# Patient Record
Sex: MALE | Race: WHITE
[De-identification: names, ages, dates, MRNs, and addresses within clinical notes are randomized per-mention and may not be internally consistent; named-entity substitution may affect disease eponyms.]

---

## 2017-06-24 NOTE — REP
MRI LUMBAR SPINE WITHOUT CONTRAST:  06/23/2017

 

CLINICAL HISTORY: 52-year-old male with low back pain and bilateral

radiculopathy.

 

COMPARISON:  X-ray 05/21/2009.

 

TECHNIQUE: Sagittal T1, T2 and STIR images with axial T1 and T2 sequences

provided.

 

FINDINGS:  Normal lumbar lordosis is maintained.  There is disc space narrowing

at L5-S1, less at L4-5, L1-2  and least at L3-L4.  The L2-3 level is spared.

Disc water and height are normal at L2-3 with some preservation of disc water

signal at L3-4, the other levels showing loss of water signal.  Vertebral body

heights and marrow signal are normal throughout.  Conus terminates at T12-L1.

There is a disc bulge at T12-L1 not causing central canal stenosis.  Foramina are

adequate.

 

At L1-2,  there is a central and left paracentral disc protrusion thinning the

ventral subarachnoid space, but the cross-sectional area of the canal is adequate

and the nerve roots are not compressed in the central canal. Foramina are

adequate at this level.

 

At L2-3, there is a mild broad-based disc bulge not causing any significant

central canal stenosis.  Foramina are adequate.

 

At L3-4, there is a central/right paracentral disc protrusion combined with

ligamentum flavum and facet hypertrophy to cause central canal stenosis.  There

is nerve root crowding at this level.  The foramina show adequate perineural fat

without specific nerve root compression on either side.  There are small

vertebral hemangiomas in the L3 vertebral body.

 

At L4-L5,  there is a broad-based disc bulge with small protrusion central and

left paracentral.  Combined with ligamentum flavum hypertrophy and facet

arthritis, the central canal is only marginally adequate.  The foramina show loss

of perineural fat without nerve root compression.

 

At L5-S1, broad-based disc bulge with the cross-sectional area of the canal

adequate.  A few millimeters of anterolisthesis of L5 on S1 due to facet

arthropathy noted.  The foramina show bilateral nerve root compression due to the

anterolisthesis, facet and marginal osteophytes.

 

IMPRESSION:

 

1.  Multilevel degenerative disc disease with minimal central canal stenosis at

L3-4 with a central and right paracentral disc protrusion, ligamentum and facet

hypertrophy.  The foramina are adequate.

 

2. At L5, S1, there is a broad-based disc bulge with some anterolisthesis of L5

on S1 due to facet arthropathy at this level.  Bilateral L5 nerve root

compression due to those factors and marginal osteophytes.

 

3.  L4-5 disc bulge and small central protrusion with marginally adequate central

canal and foramina.  Levels above show lesser degenerative change.

 

 

Signed by

Arun Roberson MD 06/24/2017 07:54 P

## 2017-07-20 NOTE — REP
Partial lumbar spine series:  Four views .

 

History:  Injection procedure for pain.

 

16 seconds of fluoroscopy time is reported.

 

Findings:  A sequence of four fluoroscopically obtained last image hold

procedural spot radiographs of the lumbar spine document needle position and

contrast injection associated with injection procedure.

 

 

Signed by

Porfirio Szymanski MD 07/20/2017 04:42 P

## 2017-07-30 NOTE — ECWPNPC
PATIENT NAME: GABRIEL LOW

: 1965

GENDER: MALE

MRN: [id number]

VISIT DATE: 2017

DISCHARGE DATE: 17 1459

VISIT LOCKED DATE TIME: 

PHYSICIAN: CHIARA QUIROZ  

RESOURCE: CHIARA QUIROZ  

 

           

           

REASON FOR APPOINTMENT

           

          1. INTRALAMINAR LUMBAR EPIDURAL

           

HISTORY OF PRESENT ILLNESS

           

      HISTORY OF PRESENT ILLNESS:

      PAIN

           

           

          THE PATIENT DESCRIBES THE PAIN...

           

      FALL RISK SCREENING:

      SCREENING

           

           

          :NO FALLS IN THE PAST YEAR

           

CURRENT MEDICATIONS

           

          TAKING LAMOTRIGINE 100 MG TABLET 2 TABLETS ORALLY TWICE A DAY,

          NOTES: 17 AM

          TAKING CLONAZEPAM 1 MG TABLET 1 TABLET ORALLY 4-6 TIMES DAILY,

          NOTES: 17 AM

          TAKING SERTRALINE HCL 50 MG TABLET 1 TABLET ORALLY ONCE A DAY,

          NOTES: 17 AM

          TAKING OXYCODONE-ACETAMINOPHEN 5-325 MG TABLET 1 TABLET AS NEEDED

          ORALLY EVERY 8 HRS AS NEEDED MDD 4, NOTES: 17 0500

          TAKING CELEBREX 200 MG CAPSULE 1 CAPSULE WITH FOOD ORALLY TWICE A

          DAY, NOTES: 17 AM

          TAKING TIZANIDINE HCL 4 MG TABLET 1 TABLET AS NEEDED ORALLY THREE

          TIMES A DAY, NOTES: 17 0500

          TAKING ACETAMINOPHEN EXTRA STRENGTH 500 MG TABLET 2 TABLETS AS

          NEEDED ORALLY EVERY 6 HRS, NOTES: NONE

          MEDICATION LIST REVIEWED AND RECONCILED WITH THE PATIENT

           

PAST MEDICAL HISTORY

           

          HYPERCHOLESTEROLEMIA

          ANXIETY

          BACK PAIN

          CARDIAC ACTH

           

ALLERGIES

           

          PENICILLIN (FOR ALLERGIES USE ONLY)

          BEE STINGS

          SHELLFISH: RASH, RESPIRATORY DIFFICULTIES: ALLERGY

           

REVIEW OF SYSTEMS

           

      REVIEWED BY:

           

          PROVIDER: _____ .

           

      CONSTITUTIONAL:

           

          ANY CHANGE IN YOUR MEDICAL CONDITION? NO . CHILLS NO . FEVER NO .

           

      INFECTION:

           

          DO YOU HAVE NEW INFECTIONS? NO . DO YOU HAVE HISTORY OF MRSA? NO

          .

           

      MUSCULOSKELETAL:

           

          ANY NEW PATTERNS OF PAIN OR NUMBNESS? NO .

           

      GASTROENTEROLOGY:

           

          ANY NEW CHANGE IN BOWEL CONTROL? NO .

           

      GENITOURINARY:

           

          ANY NEW CHANGE IN BLADDER CONTROL? NO . IS THERE A CHANCE YOU

          COULD BE PREGNANT? NO .

           

      HEMATOLOGY/LYMPH:

           

          DO YOU TAKE ANY BLOOD THINNERS? (FOR EXAMPLE- COUMADIN, PLAVIX,

          AGGRENOX, PLATEL, PRADAXA, OR XARELTO) NO . WHEN WAS YOUR LAST

          DOSE? DATE: TIME: .

           

      NEUROLOGY:

           

          HAVE YOU FALLEN IN THE PAST 6 MONTHS? NO . ANY NEW EXTREMITY

          NUMBNESS OR WEAKNESS? NO .

           

      CARDIOLOGY:

           

          DO YOU HAVE A PACEMAKER OR DEFIBRILLATOR? NO .

           

      RESPIRATORY:

           

          HAVE YOU BEEN SICK IN THE PAST WEEK? NO . FEVER NO . FLU LIKE

          SYMPTOMS? NO . COUGH NO .

           

      INTEGUMENTARY:

           

          DO YOU HAVE ANY RASHES OR OPEN SORES? NO .

           

      ALLERGIC/IMMUNO:

           

          ARE YOU ALLERGIC TO SHELLFISH OR IV DYE? NO . ANY NEW ALLERGIES?

          NO .

           

      PSYCHIATRIC:

           

          DO YOU HAVE THOUGHTS OF HURTING YOURSELF OR SOMEONE ELSE? NO .

          ARE YOU ABUSED, NEGLECTED, OR IN AN UNSAFE ENVIRONMENT? NO .

           

      ENDOCRINOLOGY:

           

          ARE YOU DIABETIC? NO .

           

      OTHER:

           

          DO YOU NEED ANY PRESCRIPTIONS? NO . IF YES, PLEASE LIST: ____ .

          ANY NEW PROBLEMS WITH YOUR MEDICATIONS? NO . WHEN DID YOU LAST

          EAT? 17 0145 . WHEN DID YOU LAST DRINK? 17 0800 . WHAT

          DID YOU LAST DRINK? COFFEE . NAME OF PERSON DRIVING YOU HOME?

          FAN CHECK . DO YOU HAVE ANY OTHER QUESTIONS OR CONCERNS NO .

           

VITAL SIGNS

           

          .0 LBS, HT 69 IN, BMI 28.06 INDEX, /87 MM HG, HR 75

          /MIN, RR 16 /MIN, TEMP 97.4 F, OXYGEN SAT % 97%, NA INITIALS TL

          1057, REVIEWED BY: CM.

           

ASSESSMENTS

           

          INTERVERTEBRAL DISC DISORDERS WITH RADICULOPATHY, LUMBAR REGION -

          M51.16 (PRIMARY)

           

PROCEDURES

           

           

          PRE PROCEDURE DIAGNOSIS LUMBAR DISC DISORDER WITH RADICULOPATHY

          POST PROCEDURE DIAGNOSIS LUMBAR DISC DISORDER WITH RADICULOPATHY

          PROCEDURE LUMBAR EPIDURAL STEROID INJECTION UNDER FLUOROSCOPIC

          GUIDANCE

          SURGEON DR. CHIARA QUIROZ

          ASSISTANT NONE

          ANESTHESIA LOCAL

          PRE PROCEDURE NOTE THE PATIENT HAS A HISTORY OF CHRONIC LOW BACK

          PAIN. I EVALUATE THE PATIENT AND REVIEWED THE CHART. I WENT OVER

          THE RISKS, ALTERNATIVES, AND BENEFITS ASSOCIATED WITH THIS

          PROCEDURE. THE PATIENT WOULD LIKE TO PROCEED AND GIVE CONSENT TO

          PERFORMED THE PROCEDURE. THE PATIENT DENIES UNEXPLAINABLE WEIGHT

          LOSS, FEVER, CHILLS, OR NEW CHANGES IN URINARY OR BOWEL CONTROL.

          DESCRIPTION OF PROCEDURE THE PATIENT WAS BROUGHT TO THE PROCEDURE

          ROOM AND PLACED IN THE PRONE POSITION. THE LUMBOSACRAL AREA WAS

          CLEANED WITH BETADINE SOLUTION AND DRAPED ASEPTICALLY. THE

          PROCEDURE WAS DONE UNDER STERILE CONDITIONS. I CHECKED LATERALITY

          AND THE LEVEL WHERE THE PROCEDURE WAS GOING TO BE PERFORMED WITH

          THE PATIENT AND THE SUPPORTING STAFF AT THE MOMENT OF THE TIME

          OUT IN THE PROCEDURE ROOM. UNDER FLUOROSCOPIC GUIDANCE, THE

          TARGET POINT WAS SELECTED AT THE INTERLAMINAR LEVEL OF L4-L5.

          LIDOCAINE WAS USED TO NUMB THE SKIN AND THE SUBCUTANEOUS TISSUE

          BELOW IT. EPIDURAL TUOHY NEEDLE, 17-GAUGE, WAS ADVANCED UNDER

          FLUOROSCOPIC GUIDANCE AND FOLLOWING PATIENT FEEDBACK UNTIL THE

          EPIDURAL SPACE WAS REACHED, 7 CM DEEP INTO THE SKIN BY THE LOSS

          OF RESISTANCE TECHNIQUE. ISOVUE M DYE 30%, 0.25 ML, WAS INJECTED

          SHOWING ADEQUATE SPREAD OF THE DYE. THEN, A SOLUTION OF 3 ML OF

          NORMAL SALINE WITH DEPO-MEDROL 60 MG WAS INJECTED SLOWLY

          FOLLOWING PATIENT FEEDBACK. THERE WAS NO EVIDENCE OF BLOOD,

          PARESTHESIA OR CEREBROSPINAL FLUID DURING THE PROCEDURE. THE

          PATIENT WAS SENT TO THE RECOVERY ROOM. THE PATIENT WAS MOVING THE

          EXTREMITIES AND DOING WELL. THERE WAS NO COMPLICATION DURING THE

          PROCEDURE. FLUOROSCOPY TIME WAS 16 SECONDS.

          POST PROCEDURE NOTE THE PATIENT WILL BE SEEN IN A FOLLOW UP IN

          THE NEXT FEW WEEKS. INSTRUCTIONS WERE GIVEN, QUESTIONS WERE

          ANSWERED, AND THE PATIENT EXPRESSED UNDERSTANDING AND AGREES WITH

          THE PLAN. I, OLMAN CONNER, DOCUMENTED THE ABOVE INFORMATION

          ACTING AS A SCRIBE FOR DR. QUIROZ. I HAVE REVIEWED THE ABOVE

          DOCUMENT, WRITTEN BY OLMAN ADAMS AND I VERIFY THAT IT

          IS ACCURATE

           

DIAGNOSTIC IMAGING

           

          [facility] FLUORO GUIDE SPINE INJECTION (PAIN)[id number]

           

PROCEDURE CODES

           

          03338 LUMBAR/SACRAL W/ IMAGING

           

          6045F RADXPS IN END [id number] PXD

           

DISPOSITION & COMMUNICATION

           

FOLLOW UP

           

          3 WEEKS

           

 

ELECTRONICALLY SIGNED BY CHIARA QUIROZ MD ON

          2017 AT 08:10 PM EDT

           

           

           

 

DISCLAIMER :

THIS IS A VISIT SUMMARY EXTRACTED FROM THE [employer] CHART.

IT IS NOT A COPY OF THE [employer] PROGRESS NOTE.

MTDD

## 2017-08-06 NOTE — ECWPNPC
PATIENT NAME: GABRIEL LOW

: 1965

GENDER: MALE

MRN: [id number]

VISIT DATE: 2017

DISCHARGE DATE: 17 1328

VISIT LOCKED DATE TIME: 

PHYSICIAN: DENICE BEAULIEU 

RESOURCE: DENICE BEAULIEU 

 

           

           

REASON FOR APPOINTMENT

           

          1. BACK PAIN

           

HISTORY OF PRESENT ILLNESS

           

      NEW PATIENT CONSULT:

      WHEN DID YOUR PAIN FIRST START?

          _____.

           

      BRIEFLY DESCRIBE HOW YOUR PAIN STARTED?

          ______.

           

      HOW DOES YOUR PAIN CHANGE WITH TIME?

          _______.

           

      DOES YOUR PAIN AWAKEN YOU FROM SLEEP?

          _____.

           

      HOW MANY HOURS OF SLEEP DO YOU NORMALLY GET?

          ______.

           

      ANY DIAGNOSTIC TESTING?

          _____.

           

      FACILITY WHERE TESTS WERE DONE?

          ____.

           

      PAIN TREATMENT

           

           

          TREATMENT YES

           

      CANCER

           

           

          HAVE YOU EVER HAD ANY TYPE OF CANCER?NO

           

           

          NO.

           

      PAIN SCREENING:

      PATIENT HAS A COMPLAINT OF ACUTE OR CHRONIC PAIN

           

           

          :YES

           

      FALL RISK SCREENING:

      SCREENING

           

           

          :NO FALLS IN THE PAST YEAR

           

      BECK INVENTORY:

      QUESTIONNAIRE

           

           

          ASSESSEDYES

           

      SCORE

           

           

          VALUE CALCULATED YES

          SCORE: 14/63 DENIES SUICIDAL OR HOMICIDAL IDEATION

           

      TODAY'S VISIT:

      NOTES:

          PT REFERRED BY DR VICENTE LOTT, [facility] FOR LOW

          BACK AND LEG PAIN. THIS IS NOT WORK RELATED. ONSET OF SEVERE BACK

          PAIN ABOUT 17 AFTER WORKING ON A FENCE. HAD NEVER HAD PAIN

          LIKE THIS BEFORE. INITIALLY PAIN WAS JUST ACROSS THE BACK, BUT

          THEN COULDN&#39;T WALK MORE THAN 10-15 FEET. STATES LEGS FELT

          LIKE CONCRETE. NOTES WORST ON THE RIGHT SIDE WITH NUMBNESS,

          TINGLING AND WEAKNESS IN LEGS RIGHT > LEFT.. COLD AIR

          INCREASES THE PAIN. NO LOSS OF BOWEL OR BLADDER CONTROL. TO ER

          7/10 /17 FOR BACK PAIN , AND HAS BEEN TO URGENT CARE FOR THE

          SAME. . SPASM INTO THE BACK HAVE BEEN UNBEARABLE. SLEEP HAS

          IMPROVED ON OXYCODONE. IS DOING PT THROUGH [facility] AND CMT

          WITH DR ZHANG. HARD TO TURN IN BED NOW. HAS APPOINTMENT WITH FOR

          SURGICAL EVALUATION&NBSP;SOS 17..

           

CURRENT MEDICATIONS

           

          TAKING LAMOTRIGINE 100 MG TABLET 2 TABLETS ORALLY TWICE A DAY

          TAKING CLONAZEPAM 1 MG TABLET 1 TABLET ORALLY 4-6 TIMES DAILY

          TAKING SERTRALINE HCL 50 MG TABLET 1 TABLET ORALLY ONCE A DAY

          TAKING OXYCODONE-ACETAMINOPHEN 5-325 MG TABLET 1 TABLET AS NEEDED

          ORALLY EVERY 8 HRS AS NEEDED MDD 4

          TAKING CELEBREX 200 MG CAPSULE 1 CAPSULE WITH FOOD ORALLY TWICE A

          DAY

          TAKING TIZANIDINE HCL 4 MG TABLET 1 TABLET AS NEEDED ORALLY THREE

          TIMES A DAY

          TAKING ACETAMINOPHEN EXTRA STRENGTH 500 MG TABLET 2 TABLETS AS

          NEEDED ORALLY EVERY 6 HRS

          MEDICATION LIST REVIEWED AND RECONCILED WITH THE PATIENT

           

PAST MEDICAL HISTORY

           

          HYPERCHOLESTEROLEMIA

          ANXIETY

          BACK PAIN

          CARDIAC ACTH

           

ALLERGIES

           

          PENICILLIN (FOR ALLERGIES USE ONLY)

          BEE STINGS

           

SURGICAL HISTORY

           

          LEFT HAND SURGERY

          FACIAL SURGERY

           

FAMILY HISTORY

           

          FATHER: , DIAGNOSED WITH CANCER

          MOTHER: ALIVE

           

SOCIAL HISTORY

           

          GENERAL:

           

          TOBACCO USE

          ARE YOU A:NONSMOKER

          ADDITIONAL FINDINGS: TOBACCO USERCHEWS FINE CUT TOBACCO

           

           

          ALCOHOL SCREENING

          POINTS0

          INTERPRETATIONNEGATIVE

           

           

          RECREATIONAL DRUG USE

          DRUG USE?NO

           

           

          CAFFEINE

          CAFFEINE USE?YES

          HOW OFTEN AND HOW MUCH? COFFEE DAILY

           

           

          LEARNING BARRIERS / SPECIAL NEEDS

          BARRIERS TO LEARNING?NO

          HEARING IMPAIRED?NO

          VISION IMPAIRED?YES

          : READING GLASSES

          READINESS TO LEARN?YES

          LEARNING PREFERENCES?NO

          SPECIAL DEVICES?YES

          : CANE

           

           

          PAIN CLINIC PFS, CLERGY, PUBLIC HEALTH REFERRALS

          PFS REFERRAL NEEDED?NO

          CLERGY REFERRAL NEEDED?NO

          PUBLIC HEALTH REFERRAL NEEDED?NO

          WAS THE PROVIDER NOTIFIED OF ANY PERTINENT INFO?NO

          HAS THE PATIENT BEEN EDUCATED REGARDING HIS/HER PLAN OF CARE?YES

          HAS THE PATIENT BEEN EDUCATED REGARDING PAIN, THE RISK FOR PAIN,

          THE IMPORTANCE OF EFFECTIVE PAIN MANAGEMENT, AND THE PAIN

          ASSESSMENT PROCESS?YES

           

           

          PATIENT: ____.

           

           

          ADVANCE DIRECTIVES

          HEALTH CARE PROXY?NO

          WOULD YOU LIKE MORE INFORMATION?NO

           

HOSPITALIZATION/MAJOR DIAGNOSTIC PROCEDURE

           

          DENIES PAST HOSPITALIZATION

           

REVIEW OF SYSTEMS

           

      REVIEWED BY:

           

          PROVIDER: DENICE MORIN .

           

      CONSTITUTIONAL:

           

          ANY CHANGE IN YOUR MEDICAL CONDITION? NO . CHILLS NO . FEVER NO .

           

      INFECTION:

           

          DO YOU HAVE NEW INFECTIONS? NO . DO YOU HAVE HISTORY OF MRSA? NO

          .

           

      MUSCULOSKELETAL:

           

          ANY NEW PATTERNS OF PAIN OR NUMBNESS? NO . SYTEMIC LUPUS NO .

           

      GASTROENTEROLOGY:

           

          ANY NEW CHANGE IN BOWEL CONTROL? NO . BARRETTS ESOPHAGUS NO .

          CIRRHOSIS NO . HEPATITIS NO . LIVER FAILURE NO . ACID REFLUX NO .

          UNEXPLAINED WEIGHT LOSS NO .

           

      GENITOURINARY:

           

          ANY NEW CHANGE IN BLADDER CONTROL? NO . IS THERE A CHANCE YOU

          COULD BE PREGNANT? NO .

           

      HEMATOLOGY/LYMPH:

           

          DO YOU TAKE ANY BLOOD THINNERS? (FOR EXAMPLE- COUMADIN, PLAVIX,

          AGGRENOX, PLATEL, PRADAXA, OR XARELTO) NO . WHEN WAS YOUR LAST

          DOSE? DATE: TIME: . LOW PLATELET COUNT NO . SICKLE CELL DISEASE

          NO . VON WILLIEBRANDS NO . FACTOR V LEIDEN NO . THALLASEMIA NO .

          ANEMIA NO . EASY BRUISING NO .

           

      NEUROLOGY:

           

          HAVE YOU FALLEN IN THE PAST 6 MONTHS? NO . ANY NEW EXTREMITY

          NUMBNESS OR WEAKNESS? YES, RIGHT FOOT OCCASIONALLY . HEAD INJURY

          YES, MANY CONCUSSIONS IN PAST HISTORY. . DEMENTIA NO . CEREBRAL

          PALSY NO . MULTIPLE SCLEROSIS NO . DIZZINESS NO . HEADACHE NO .

          STROKES NO . VERTIGO NO .

           

      CARDIOLOGY:

           

          DO YOU HAVE A PACEMAKER OR DEFIBRILLATOR? NO . ANGINA NO . HEART

          ATTACK NO . HEART SURGERY NO . CONGESTIVE HEART FAILURE/FLUID

          OVERLOAD NO . CHEST PAIN HAS A HX OF CHEST PAIN. HAD CARDIAC WORK

          UP WITH DR MARCH. CARDIAC CATH. ALL NEGATIVE. . HIGH BLOOD PRESSURE

          NO . IRREGULAR HEART BEAT NO .

           

      RESPIRATORY:

           

          HAVE YOU BEEN SICK IN THE PAST WEEK? NO . FEVER NO . FLU LIKE

          SYMPTOMS? NO . CPAP NO . BYPAP NO . ASTHMA NO . EMPHYSEMA NO .

          CHRONIC LUNG DISEASES NO . SHORTNESS OF BREATH ON EXERTION NO .

          DO YOU USE ANY TYPE OF TOBACCO (SMOKE, SMOKELESS, CHEW)? YES .

          COUGH NO . SNORING NO .

           

      INTEGUMENTARY:

           

          DO YOU HAVE ANY RASHES OR OPEN SORES? NO .

           

      ALLERGIC/IMMUNO:

           

          ARE YOU ALLERGIC TO SHELLFISH OR IV DYE? YES, SHELLFISH RASH,

          SWELL UP, DIFFICULT BREATHING. . ANY NEW ALLERGIES? NO .

           

      PSYCHIATRIC:

           

          DO YOU HAVE THOUGHTS OF HURTING YOURSELF OR SOMEONE ELSE? NO .

          ARE YOU ABUSED, NEGLECTED, OR IN AN UNSAFE ENVIRONMENT? NO .

           

      ENDOCRINOLOGY:

           

          ARE YOU DIABETIC? NO . THYROID DISORDER NO .

           

      OTHER:

           

          DO YOU NEED ANY PRESCRIPTIONS? NO . IF YES, PLEASE LIST: ____ .

          ANY NEW PROBLEMS WITH YOUR MEDICATIONS? NO . WHEN DID YOU LAST

          EAT? ____ . WHEN DID YOU LAST DRINK? ____ . WHAT DID YOU LAST

          DRINK? ____ . NAME OF PERSON DRIVING YOU HOME? ____ . DO YOU HAVE

          ANY OTHER QUESTIONS OR CONCERNS NO .

           

VITAL SIGNS

           

          .2 LBS, HT 69 IN, BMI 28.38 INDEX, /79 MM HG, HR 75

          /MIN, RR 18 /MIN, TEMP 98.1 F, OXYGEN SAT % 96%, NA INITIALS SC

          11:53, REVIEWED BY: SAM.

           

EXAMINATION

           

      GENERAL EXAMINATION:

          GENERAL APPEARANCE:THIN, WELL TANNED.

           

          PSYCHALERT , ORIENTED X 3 , ANXIOUS, [facility] CONTACT.

          ACCOMPANIED BY WIFE FOR VISIT.

           

          HEENT:NORMOCEPHALIC, NO LYMPHADENENOPATHY, NO THYROMEGLY.

           

          LUNGS:CLEAR TO AUSCULTATION BILATERALLY, NO WHEEZES, RALES OR

          RHONCHI.

           

          HEART:NORMAL S1S2, NO MURMURS, CLICK OR RUBS, NO CAROTID BRUITS.

           

          MUSCULOSKELETAL:MUSCLE STRENGTH TESTING 5/5 BILATERAL UPPER AND

          LOWER EXTREMITIES. CAN FLEX TO 45 DEGREES, EXTEND TO 5 DERGREES

          AND HAS PAIN WITH ROTATION. PAIN W SLR ON LEFT. POINT TENDERNESS

          OVER RIGHT > LEFT LUMOSACRAL AXIS, NO SPECIFIC SIJ TENDERNESS.

          PAIN WITH PELVIC COMPRESSION AND PATRICKS TESTING ON LEFT. LEG

          LENGTH EQUAL. SLOW TO RISE TO STANDING POSIITION. POSTURE

          STOOPED, GAIT ANTALGIC.

           

          NEUROLOGIC EXAM:PATCHY DYSESTHESIA TO LIGHT TOUCH OVER DISTAL AND

          PROXIMAL LEFT LOWER EXTREMITY.DTR'S 1+ BILATERALLY IN UPPER AND

          LOWER EXTREMITIES.

           

          DIAGNOSTIC TESTS REVIEWEDMRI LUMBAR SPINE COMPLETED 17

          REVIEWED.

           

ASSESSMENTS

           

          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

           

          LUMBAR RADICULOPATHY - M54.16

           

          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

           

TREATMENT

           

      LUMBAR DISC DISPLACEMENT WITHOUT MYELOPATHY

          NOTES: INTRALAMINAR LUMBAR EPIDURAL,LUMBAR EPIDURAL INJECTION:

          YOUR PROCEDURE MATERIAL WAS PRINTED,WHAT IS LUMBAR EPIDURAL

          INJECTION? MATERIAL WAS PRINTED.

           

PREVENTIVE MEDICINE

           

          GAVE INFO ON LUMBAR EPIDURAL AND PRE PROCEDURE CARE / PT

          EXPRESSED UNDERSTANDING.

           

PROCEDURE CODES

           

           ESTABILISHED PATIENT [facility] FACILITY CHARGE

           

DISPOSITION & COMMUNICATION

           

FOLLOW UP

           

          AFTER INJECTION (REASON: CHECK INTRALAMINAR LUMBAR EPIDURAL)

           

 

ELECTRONICALLY SIGNED BY RUPESH PARRISH ON

          2017 AT 01:07 PM EDT

           

           

           

 

DISCLAIMER :

THIS IS A VISIT SUMMARY EXTRACTED FROM THE [employer] CHART.

IT IS NOT A COPY OF THE [employer] PROGRESS NOTE.

BALDO

## 2017-08-26 NOTE — ECWPNPC
PATIENT NAME: GABRIEL LOW

: 1965

GENDER: MALE

MRN: [id number]

VISIT DATE: 2017

DISCHARGE DATE: 17 1358

VISIT LOCKED DATE TIME: 

PHYSICIAN: DENICE BEAULIEU 

RESOURCE: DENICE BEAULIEU 

 

           

           

HISTORY OF PRESENT ILLNESS

           

      HISTORY OF PRESENT ILLNESS:

      PAIN

           

           

          THE PATIENT DESCRIBES THE PAIN...

           

      FALL RISK SCREENING:

      SCREENING

           

           

          :NO FALLS IN THE PAST YEAR

           

      TODAY'S VISIT:

      NOTES:

          S/P LESB COMPLETED ON 17. IS NO LONGER HAVING PAIN RADIATIG

          INTO THE RIGHT LEG. DOES HAVE &QUOT;HOT SHOT&QUOT; OF PAIN INTO

          FEET AND ANKLES. IS HAVING TINGLING IN TESTICLES/SCROTOM AND

          BUTTUCKS. PAIN LEVEL PRIOR TO PROCEDURE - AN D POST PROCEDURE

          2-5/10/ IS NOW EXPERIENCING PAIN RADIATING INTO RECTUM AND GROIN

          AND HAS BEEN SEEN AT [facility] DR ABAD WHERE SURGERY IS BEING

          PLANNED. .

           

CURRENT MEDICATIONS

           

          TAKING LAMOTRIGINE 100 MG TABLET 2 TABLETS ORALLY TWICE A DAY

          TAKING CLONAZEPAM 1 MG TABLET 1 TABLET ORALLY 4-6 TIMES DAILY

          TAKING SERTRALINE HCL 50 MG TABLET 1 TABLET ORALLY ONCE A DAY

          TAKING CELEBREX 200 MG CAPSULE 1 CAPSULE WITH FOOD ORALLY TWICE A

          DAY

          TAKING TIZANIDINE HCL 4 MG TABLET 1 TABLET AS NEEDED ORALLY THREE

          TIMES A DAY

          TAKING ACETAMINOPHEN EXTRA STRENGTH 500 MG TABLET 2 TABLETS AS

          NEEDED ORALLY EVERY 6 HRS, NOTES: NONE

          NOT-TAKING OXYCODONE-ACETAMINOPHEN 5-325 MG TABLET 1 TABLET AS

          NEEDED ORALLY EVERY 8 HRS AS NEEDED MDD 4

          MEDICATION LIST REVIEWED AND RECONCILED WITH THE PATIENT

           

PAST MEDICAL HISTORY

           

          HYPERCHOLESTEROLEMIA

          ANXIETY

          BACK PAIN

          CARDIAC ACTH

           

ALLERGIES

           

          PENICILLIN (FOR ALLERGIES USE ONLY)

          BEE STINGS

          SHELLFISH: RASH, RESPIRATORY DIFFICULTIES: ALLERGY

           

SURGICAL HISTORY

           

          LEFT ORBITAL BONE, SOCKET REPAIR

          LEFT HAND SURGERY FROM INJURY

           

REVIEW OF SYSTEMS

           

      REVIEWED BY:

           

          PROVIDER: DENICE BEAULIEU FNP .

           

      CONSTITUTIONAL:

           

          ANY CHANGE IN YOUR MEDICAL CONDITION? YES, PT STATES NEW PAIN TO

          GROIN AND RECTUM X 4 DAYS. . CHILLS NO . FEVER NO .

           

      INFECTION:

           

          DO YOU HAVE NEW INFECTIONS? NO . DO YOU HAVE HISTORY OF MRSA? NO

          .

           

      MUSCULOSKELETAL:

           

          ANY NEW PATTERNS OF PAIN OR NUMBNESS? YES, PT STATES LESI DONE

          17, PRE PROCEDURE PAIN WAS 7/10. POST PROCEDURE PAIN WAS

          4/10 .

           

      GASTROENTEROLOGY:

           

          ANY NEW CHANGE IN BOWEL CONTROL? NO .

           

      GENITOURINARY:

           

          ANY NEW CHANGE IN BLADDER CONTROL? NO . IS THERE A CHANCE YOU

          COULD BE PREGNANT? NO .

           

      HEMATOLOGY/LYMPH:

           

          DO YOU TAKE ANY BLOOD THINNERS? (FOR EXAMPLE- COUMADIN, PLAVIX,

          AGGRENOX, PLATEL, PRADAXA, OR XARELTO) NO . WHEN WAS YOUR LAST

          DOSE? DATE: TIME: .

           

      NEUROLOGY:

           

          HAVE YOU FALLEN IN THE PAST 6 MONTHS? NO . ANY NEW EXTREMITY

          NUMBNESS OR WEAKNESS? NO .

           

      CARDIOLOGY:

           

          DO YOU HAVE A PACEMAKER OR DEFIBRILLATOR? NO .

           

      RESPIRATORY:

           

          HAVE YOU BEEN SICK IN THE PAST WEEK? NO . FEVER NO . FLU LIKE

          SYMPTOMS? NO . COUGH NO .

           

      INTEGUMENTARY:

           

          DO YOU HAVE ANY RASHES OR OPEN SORES? NO .

           

      ALLERGIC/IMMUNO:

           

          ARE YOU ALLERGIC TO SHELLFISH OR IV DYE? YES, SHELLFISH . ANY NEW

          ALLERGIES? NO .

           

      PSYCHIATRIC:

           

          DO YOU HAVE THOUGHTS OF HURTING YOURSELF OR SOMEONE ELSE? NO .

          ARE YOU ABUSED, NEGLECTED, OR IN AN UNSAFE ENVIRONMENT? NO .

           

      ENDOCRINOLOGY:

           

          ARE YOU DIABETIC? NO .

           

      OTHER:

           

          DO YOU NEED ANY PRESCRIPTIONS? NO . IF YES, PLEASE LIST: ____ .

          ANY NEW PROBLEMS WITH YOUR MEDICATIONS? NO . WHEN DID YOU LAST

          EAT? ____ . WHEN DID YOU LAST DRINK? ____ . WHAT DID YOU LAST

          DRINK? ____ . NAME OF PERSON DRIVING YOU HOME? ____ . DO YOU HAVE

          ANY OTHER QUESTIONS OR CONCERNS NO .

           

VITAL SIGNS

           

           LBS, HT 69 IN, BMI 28.94 INDEX, /80 MM HG, HR 68

          /MIN, RR 18 /MIN, TEMP 98.3 F, OXYGEN SAT % 96, SAFE IN ENV?

          (Y/N) Y, REVIEWED BY: EM.

           

EXAMINATION

           

      GENERAL EXAMINATION:

          PSYCHORIENTED X 3 , ALERT , VERY TALKATIVE.

           

          LUNGS:CLEAR TO AUSCULTATION BILATERALLY.

           

          HEART:HEART RATE REGULAR.

           

          MUSCULOSKELETAL:WEAKNESS WITH RIGHT ANKLE FLEXION. NO TENDERNESS

          WITH PALPATION AT SIJ BILATERALLY.TENDER WITH PALPATION OVER

          LUMBAR SPINOUS PROCESSES. SLOW TO RISE TO STANDING POSITION..

           

ASSESSMENTS

           

          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

           

          LUMBAR RADICULOPATHY - M54.16

           

          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

           

TREATMENT

           

      LUMBAR DISC DISPLACEMENT WITHOUT MYELOPATHY

          NOTES: GET FOLLOWUP SURGICAL APPOINTMENT WITH DR ABAD. NO

          FURTHER INJECTIONS AT THIS TIME. WALK AS TOLERATED. NO SITUPS.

           

PROCEDURE CODES

           

           ESTABILISHED PATIENT [facility] FACILITY CHARGE

           

DISPOSITION & COMMUNICATION

           

FOLLOW UP

           

          CALL IF APPOINTMENT NEEDED (REASON: BACK )

           

 

ELECTRONICALLY SIGNED BY RUPESH PARRISH ON

          2017 AT 03:32 PM EDT

           

           

           

 

DISCLAIMER :

THIS IS A VISIT SUMMARY EXTRACTED FROM THE [employer] CHART.

IT IS NOT A COPY OF THE [employer] PROGRESS NOTE.

BALDO

## 2017-08-31 NOTE — ECWPNPC
PATIENT NAME: GABRIEL LOW

: 1965

GENDER: MALE

MRN: [id number]

VISIT DATE: 2017

DISCHARGE DATE: 17 1145

VISIT LOCKED DATE TIME: 

PHYSICIAN: DENICE BEAULIEU 

RESOURCE: DENICE BEAULIEU 

 

           

           

REASON FOR APPOINTMENT

           

          1. BACK

           

HISTORY OF PRESENT ILLNESS

           

      HISTORY OF PRESENT ILLNESS:

      PAIN

           

           

          THE PATIENT DESCRIBES THE PAIN...

           

      FALL RISK SCREENING:

      SCREENING

           

           

          :NO FALLS IN THE PAST YEAR

           

      TODAY'S VISIT:

      NOTES:

          RATES PAIN TODAY AS 5/10. REPORTS PAIN IS CONSTANT, ACHING,

          BURNING, SHARP, STABBING AND SHOOTING. PAIN IS LOCATED ACROSS THE

          LOW BACK AND LOW BADOMEN WITH RADIATION TO BOTH LEGS TO LEVEL OF

          MID CALF.DID HAVE APPOINTMENT WITH [facility] AND SURGERY IS SCHEDULED

          FOR OCTOBER 10. 2017. IS STILL HAVING NUMBNESS/TINGLING INTO

          SADDLE REGION, AS WELL AS PAIN. .

           

CURRENT MEDICATIONS

           

          TAKING LAMOTRIGINE 100 MG TABLET 2 TABLETS ORALLY TWICE A DAY

          TAKING CLONAZEPAM 1 MG TABLET 1 TABLET ORALLY 4-6 TIMES DAILY

          TAKING SERTRALINE HCL 50 MG TABLET 1 TABLET ORALLY ONCE A DAY

          TAKING CELEBREX 200 MG CAPSULE 1 CAPSULE WITH FOOD ORALLY TWICE A

          DAY

          TAKING TIZANIDINE HCL 4 MG TABLET 1 TABLET AS NEEDED ORALLY THREE

          TIMES A DAY

          TAKING ACETAMINOPHEN EXTRA STRENGTH 500 MG TABLET 2 TABLETS AS

          NEEDED ORALLY EVERY 6 HRS, NOTES: NONE

          TAKING OXYCODONE-ACETAMINOPHEN 5-325 MG TABLET 1 TABLET AS NEEDED

          ORALLY EVERY 8 HRS AS NEEDED MDD 4

          MEDICATION LIST REVIEWED AND RECONCILED WITH THE PATIENT

           

PAST MEDICAL HISTORY

           

          HYPERCHOLESTEROLEMIA

          ANXIETY

          BACK PAIN

          CARDIAC ACTH

           

ALLERGIES

           

          PENICILLIN (FOR ALLERGIES USE ONLY)

          BEE STINGS

          SHELLFISH: RASH, RESPIRATORY DIFFICULTIES: ALLERGY

           

REVIEW OF SYSTEMS

           

      REVIEWED BY:

           

          PROVIDER: DENICE BEAULIEU FNP .

           

      CONSTITUTIONAL:

           

          ANY CHANGE IN YOUR MEDICAL CONDITION? NO . CHILLS NO . FEVER NO .

           

      INFECTION:

           

          DO YOU HAVE NEW INFECTIONS? NO . DO YOU HAVE HISTORY OF MRSA? NO

          .

           

      MUSCULOSKELETAL:

           

          ANY NEW PATTERNS OF PAIN OR NUMBNESS? NO .

           

      GASTROENTEROLOGY:

           

          ANY NEW CHANGE IN BOWEL CONTROL? NO .

           

      GENITOURINARY:

           

          ANY NEW CHANGE IN BLADDER CONTROL? NO . IS THERE A CHANCE YOU

          COULD BE PREGNANT? NO .

           

      HEMATOLOGY/LYMPH:

           

          DO YOU TAKE ANY BLOOD THINNERS? (FOR EXAMPLE- COUMADIN, PLAVIX,

          AGGRENOX, PLATEL, PRADAXA, OR XARELTO) NO . WHEN WAS YOUR LAST

          DOSE? DATE: TIME: .

           

      NEUROLOGY:

           

          HAVE YOU FALLEN IN THE PAST 6 MONTHS? NO . ANY NEW EXTREMITY

          NUMBNESS OR WEAKNESS? NO .

           

      CARDIOLOGY:

           

          DO YOU HAVE A PACEMAKER OR DEFIBRILLATOR? NO .

           

      RESPIRATORY:

           

          HAVE YOU BEEN SICK IN THE PAST WEEK? NO . FEVER NO . FLU LIKE

          SYMPTOMS? NO . COUGH NO .

           

      INTEGUMENTARY:

           

          DO YOU HAVE ANY RASHES OR OPEN SORES? NO .

           

      ALLERGIC/IMMUNO:

           

          ARE YOU ALLERGIC TO SHELLFISH OR IV DYE? YES, SHELLFISH . ANY NEW

          ALLERGIES? NO .

           

      PSYCHIATRIC:

           

          DO YOU HAVE THOUGHTS OF HURTING YOURSELF OR SOMEONE ELSE? NO .

          ARE YOU ABUSED, NEGLECTED, OR IN AN UNSAFE ENVIRONMENT? NO .

           

      ENDOCRINOLOGY:

           

          ARE YOU DIABETIC? NO .

           

      OTHER:

           

          DO YOU NEED ANY PRESCRIPTIONS? NO . IF YES, PLEASE LIST: ____ .

          ANY NEW PROBLEMS WITH YOUR MEDICATIONS? NO . WHEN DID YOU LAST

          EAT? ____ . WHEN DID YOU LAST DRINK? ____ . WHAT DID YOU LAST

          DRINK? ____ . NAME OF PERSON DRIVING YOU HOME? ____ . DO YOU HAVE

          ANY OTHER QUESTIONS OR CONCERNS NO .

           

VITAL SIGNS

           

           LBS, HT 69 IN, BMI 28.06 INDEX, /69 MM HG, HR 61

          /MIN, RR 16 /MIN, TEMP 97.5 F, OXYGEN SAT % 97%, NA INITIALS SC

          11:06, REVIEWED BY: STORM.

           

EXAMINATION

           

      GENERAL EXAMINATION:

          PSYCHORIENTED X 3 , ALERT , VERY TALKATIVE.

           

          LUNGS:CLEAR TO AUSCULTATION BILATERALLY.

           

          HEART:HEART RATE REGULAR.

           

          MUSCULOSKELETAL:WEAKNESS WITH RIGHT ANKLE FLEXION. NO TENDERNESS

          WITH PALPATION AT SIJ BILATERALLY.TENDER WITH PALPATION OVER

          LUMBAR SPINOUS PROCESSES. SLOW TO RISE TO STANDING POSITION..

           

ASSESSMENTS

           

          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

           

          LUMBAR RADICULOPATHY - M54.16

           

          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

           

TREATMENT

           

      LUMBAR DISC DISPLACEMENT WITHOUT MYELOPATHY

          NOTES: INTRALAMINAR LUMBAR EPIDURAL CONTINUE WALKING, SWIMMING,

          GENTLE EXERCISES AND STRETCHES. NO SIT UPS.

           

PREVENTIVE MEDICINE

           

      PAIN CLINIC TEACHING:

           

          PROCEDURE TEACHING PRE-PROCEDURE TEACHING DONE. PATIENT

          VERBALIZES UNDERSTANDING..

           

PROCEDURE CODES

           

           ESTABILISHED PATIENT [facility] FACILITY CHARGE

           

DISPOSITION & COMMUNICATION

           

FOLLOW UP

           

          AFTER INJECTION (REASON: CHECK AUTH INTRALAMINAR LUMBAR EPIDURAL

          )

           

 

ELECTRONICALLY SIGNED BY RUPESH PARRISH ON

          2017 AT 06:58 PM EDT

           

           

           

 

DISCLAIMER :

THIS IS A VISIT SUMMARY EXTRACTED FROM THE [employer] CHART.

IT IS NOT A COPY OF THE [employer] PROGRESS NOTE.

BALDO

## 2017-09-06 NOTE — REP
Partial lumbar spine series:  Three views .

 

History:  Injection procedure for pain.

 

Six seconds of fluoroscopy time is reported.

 

Findings:  A sequence of three fluoroscopically obtained last image hold

procedural spot radiographs of the lumbar spine document needle position and

contrast injection associated with injection procedure.

 

 

Signed by

Porfirio Szymanski MD 09/06/2017 02:09 P

## 2017-09-07 NOTE — ECWPNPC
PATIENT NAME: GABRIEL LOW

: 1965

GENDER: MALE

MRN: [id number]

VISIT DATE: 2017

DISCHARGE DATE: 17 1400

VISIT LOCKED DATE TIME: 

PHYSICIAN: CHIARA QURIOZ  

RESOURCE: CHIARA QUIROZ  

 

           

           

REASON FOR APPOINTMENT

           

          1. INTERLAMINAL

           

HISTORY OF PRESENT ILLNESS

           

      HISTORY OF PRESENT ILLNESS:

      PAIN

           

           

          THE PATIENT DESCRIBES THE PAIN...

           

      FALL RISK SCREENING:

      SCREENING

           

           

          :ONE FALL WITHOUT INJURY IN THE PAST YEAR

           

CURRENT MEDICATIONS

           

          TAKING LAMOTRIGINE 100 MG TABLET 2 TABLETS ORALLY TWICE A DAY,

          NOTES: 17@

          TAKING CLONAZEPAM 1 MG TABLET 1 TABLET ORALLY 4-6 TIMES DAILY,

          NOTES: 

          TAKING SERTRALINE HCL 50 MG TABLET 1 TABLET ORALLY ONCE A DAY,

          NOTES: 17@0900

          TAKING CELEBREX 200 MG CAPSULE 1 CAPSULE WITH FOOD ORALLY TWICE A

          DAY, NOTES: 

          TAKING TIZANIDINE HCL 4 MG TABLET 1 TABLET AS NEEDED ORALLY THREE

          TIMES A DAY, NOTES: 

          TAKING ACETAMINOPHEN EXTRA STRENGTH 500 MG TABLET 2 TABLETS AS

          NEEDED ORALLY EVERY 6 HRS, NOTES: NONE

          TAKING OXYCODONE-ACETAMINOPHEN 5-325 MG TABLET 1 TABLET AS NEEDED

          ORALLY EVERY 8 HRS AS NEEDED MDD 4, NOTES: 17@

          MEDICATION LIST REVIEWED AND RECONCILED WITH THE PATIENT

           

PAST MEDICAL HISTORY

           

          HYPERCHOLESTEROLEMIA

          ANXIETY

          BACK PAIN

          CARDIAC ACTH

           

ALLERGIES

           

          PENICILLIN (FOR ALLERGIES USE ONLY)

          BEE STINGS

          SHELLFISH: RASH, RESPIRATORY DIFFICULTIES: ALLERGY

           

SOCIAL HISTORY

           

          GENERAL:

           

          TOBACCO USE

          ARE YOU A:CURRENT SMOKER

          PATIENT COUNSELED ON THE DANGERS OF TOBACCO USE AND URGED TO

          QUIT:2017

          ARE YOU INTERESTED IN QUITTING?THINKING ABOUT QUITTING

          COUNSELED THE PATIENT ON SMOKING CESSATION, EDUCATION

          [id number]/06/2017

          ADDITIONAL FINDINGS: TOBACCO USERCHEWS FINE CUT TOBACCO

           

           

          ALCOHOL SCREENING

          DID YOU HAVE A DRINK CONTAINING ALCOHOL IN THE PAST YEAR?NO

          POINTS0

          INTERPRETATIONNEGATIVE

           

           

          RECREATIONAL DRUG USE

          DRUG USE?NO

           

           

          CAFFEINE

          CAFFEINE USE?YES

          HOW OFTEN AND HOW MUCH? COFFEE DAILY

           

           

          LEARNING BARRIERS / SPECIAL NEEDS

          BARRIERS TO LEARNING?NO

          HEARING IMPAIRED?NO

          VISION IMPAIRED?YES

          : READING GLASSES

          READINESS TO LEARN?YES

          LEARNING PREFERENCES?NO

          SPECIAL DEVICES?YES

          : CANE

           

           

          PAIN CLINIC PFS, CLERGY, PUBLIC HEALTH REFERRALS

          PFS REFERRAL NEEDED?NO

          CLERGY REFERRAL NEEDED?NO

          PUBLIC HEALTH REFERRAL NEEDED?NO

          WAS THE PROVIDER NOTIFIED OF ANY PERTINENT INFO?NO

          HAS THE PATIENT BEEN EDUCATED REGARDING HIS/HER PLAN OF CARE?YES

          HAS THE PATIENT BEEN EDUCATED REGARDING PAIN, THE RISK FOR PAIN,

          THE IMPORTANCE OF EFFECTIVE PAIN MANAGEMENT, AND THE PAIN

          ASSESSMENT PROCESS?YES

           

           

          PATIENT: ____.

           

           

          ADVANCE DIRECTIVES

          HEALTH CARE PROXY?NO

          WOULD YOU LIKE MORE INFORMATION?NO

           

REVIEW OF SYSTEMS

           

      REVIEWED BY:

           

          PROVIDER: _____ .

           

      CONSTITUTIONAL:

           

          ANY CHANGE IN YOUR MEDICAL CONDITION? NO . CHILLS NO . FEVER NO .

           

      INFECTION:

           

          DO YOU HAVE NEW INFECTIONS? NO . DO YOU HAVE HISTORY OF MRSA? NO

          .

           

      MUSCULOSKELETAL:

           

          ANY NEW PATTERNS OF PAIN OR NUMBNESS? NO .

           

      GASTROENTEROLOGY:

           

          ANY NEW CHANGE IN BOWEL CONTROL? NO .

           

      GENITOURINARY:

           

          ANY NEW CHANGE IN BLADDER CONTROL? NO . IS THERE A CHANCE YOU

          COULD BE PREGNANT? NO .

           

      HEMATOLOGY/LYMPH:

           

          DO YOU TAKE ANY BLOOD THINNERS? (FOR EXAMPLE- COUMADIN, PLAVIX,

          AGGRENOX, PLATEL, PRADAXA, OR XARELTO) NO . WHEN WAS YOUR LAST

          DOSE? DATE: TIME: .

           

      NEUROLOGY:

           

          HAVE YOU FALLEN IN THE PAST 6 MONTHS? YES . ANY NEW EXTREMITY

          NUMBNESS OR WEAKNESS? NO .

           

      CARDIOLOGY:

           

          DO YOU HAVE A PACEMAKER OR DEFIBRILLATOR? NO .

           

      RESPIRATORY:

           

          HAVE YOU BEEN SICK IN THE PAST WEEK? NO . FEVER NO . FLU LIKE

          SYMPTOMS? NO . COUGH NO .

           

      INTEGUMENTARY:

           

          DO YOU HAVE ANY RASHES OR OPEN SORES? NO .

           

      ALLERGIC/IMMUNO:

           

          ARE YOU ALLERGIC TO SHELLFISH OR IV DYE? YES, SHELLFISH . ANY NEW

          ALLERGIES? NO .

           

      PSYCHIATRIC:

           

          DO YOU HAVE THOUGHTS OF HURTING YOURSELF OR SOMEONE ELSE? NO .

          ARE YOU ABUSED, NEGLECTED, OR IN AN UNSAFE ENVIRONMENT? NO .

           

      ENDOCRINOLOGY:

           

          ARE YOU DIABETIC? NO .

           

      OTHER:

           

          DO YOU NEED ANY PRESCRIPTIONS? NO . IF YES, PLEASE LIST: ____ .

          ANY NEW PROBLEMS WITH YOUR MEDICATIONS? NO . WHEN DID YOU LAST

          EAT? ____17 . WHEN DID YOU LAST DRINK? ____08 . WHAT DID

          YOU LAST DRINK? ____COFFEE . NAME OF PERSON DRIVING YOU HOME?

          ____WIFE . DO YOU HAVE ANY OTHER QUESTIONS OR CONCERNS TRIPPED

          OVER TOILET FELL FORWARD WITHOUT PAIN. .

           

VITAL SIGNS

           

          .2 LBS, HT 69 IN, BMI 28.08 INDEX, /93 MM HG, HR 72

          /MIN, RR 18 /MIN, TEMP 98.2 F, OXYGEN SAT % 96%, NA INITIALS SC

          11:52, REVIEWED BY: VD.

           

ASSESSMENTS

           

          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

           

PROCEDURES

           

           

          PRE PROCEDURE DIAGNOSIS LUMBOSACRAL DISC DISORDER WITH

          RADICULOPATHY

          POST PROCEDURE DIAGNOSIS LUMBOSACRAL DISC DISORDER WITH

          RADICULOPATHY

          PROCEDURE LUMBAR EPIDURAL STEROID INJECTION UNDER FLUOROSCOPIC

          GUIDANCE

          SURGEON DR. CHIARA QUIROZ

          ASSISTANT NONE

          ANESTHESIA LOCAL

          PRE PROCEDURE NOTE THE PATIENT HAS A HISTORY OF CHRONIC LOW BACK

          PAIN. I EVALUATE THE PATIENT AND REVIEWED THE CHART. I WENT OVER

          THE RISKS, ALTERNATIVES, AND BENEFITS ASSOCIATED WITH THIS

          PROCEDURE. THE PATIENT WOULD LIKE TO PROCEED AND GIVE CONSENT TO

          PERFORMED THE PROCEDURE. THE PATIENT DENIES UNEXPLAINABLE WEIGHT

          LOSS, FEVER, CHILLS, OR NEW CHANGES IN URINARY OR BOWEL CONTROL.

          DESCRIPTION OF PROCEDURE THE PATIENT WAS BROUGHT TO THE PROCEDURE

          ROOM AND PLACED IN THE PRONE POSITION. THE LUMBOSACRAL AREA WAS

          CLEANED WITH BETADINE SOLUTION AND DRAPED ASEPTICALLY. THE

          PROCEDURE WAS DONE UNDER STERILE CONDITIONS. I CHECKED LATERALITY

          AND THE LEVEL WHERE THE PROCEDURE WAS GOING TO BE PERFORMED WITH

          THE PATIENT AND THE SUPPORTING STAFF AT THE MOMENT OF THE TIME

          OUT IN THE PROCEDURE ROOM. UNDER FLUOROSCOPIC GUIDANCE, THE

          TARGET POINT WAS SELECTED AT THE INTERLAMINAR LEVEL OF L5-S1.

          LIDOCAINE WAS USED TO NUMB THE SKIN AND THE SUBCUTANEOUS TISSUE

          BELOW IT. EPIDURAL TUOHY NEEDLE, 17-GAUGE, WAS ADVANCED UNDER

          FLUOROSCOPIC GUIDANCE AND FOLLOWING PATIENT FEEDBACK UNTIL THE

          EPIDURAL SPACE WAS REACHED, 7 CM DEEP INTO THE SKIN BY THE LOSS

          OF RESISTANCE TECHNIQUE. ISOVUE M DYE 30%, 0.25 ML, WAS INJECTED

          SHOWING ADEQUATE SPREAD OF THE DYE. THEN, A SOLUTION OF 3 ML OF

          NORMAL SALINE WITH DEPO-MEDROL 60 MG WAS INJECTED SLOWLY

          FOLLOWING PATIENT FEEDBACK. THERE WAS NO EVIDENCE OF BLOOD,

          PARESTHESIA OR CEREBROSPINAL FLUID DURING THE PROCEDURE. THE

          PATIENT WAS SENT TO THE RECOVERY ROOM. THE PATIENT WAS MOVING THE

          EXTREMITIES AND DOING WELL. THERE WAS NO COMPLICATION DURING THE

          PROCEDURE. FLUOROSCOPY TIME WAS 6 SECONDS.

          POST PROCEDURE NOTE THE PATIENT WILL BE SEEN IN A FOLLOW UP IN

          THE NEXT FEW WEEKS. INSTRUCTIONS WERE GIVEN, QUESTIONS WERE

          ANSWERED, AND THE PATIENT EXPRESSED UNDERSTANDING AND AGREES WITH

          THE PLAN. I, OLMAN CONNER, DOCUMENTED THE ABOVE INFORMATION

          ACTING AS A SCRIBE FOR DR. QUIROZ. I HAVE REVIEWED THE ABOVE

          DOCUMENT, WRITTEN BY OLMAN ADAMS AND I VERIFY THAT IT

          IS ACCURATE

           

DIAGNOSTIC IMAGING

           

          [facility] FLUORO GUIDE SPINE INJECTION (PAIN)[id number]

           

PROCEDURE CODES

           

          90081 LUMBAR/SACRAL W/ IMAGING

           

          6045F RADXPS IN END [id number] PXD

           

DISPOSITION & COMMUNICATION

           

FOLLOW UP

           

          3 WEEKS

           

 

ELECTRONICALLY SIGNED BY CHIARA QUIROZ MD ON

          2017 AT 05:36 PM EDT

           

           

           

 

DISCLAIMER :

THIS IS A VISIT SUMMARY EXTRACTED FROM THE [employer] CHART.

IT IS NOT A COPY OF THE [employer] PROGRESS NOTE.

MTDD

## 2017-10-18 NOTE — ECWPNPC
PATIENT NAME: GABRIEL LOW

: 1965

GENDER: MALE

MRN: [id number]

VISIT DATE: 2017

DISCHARGE DATE: 17 1511

VISIT LOCKED DATE TIME: 

PHYSICIAN: DENICE BEAULIEU 

RESOURCE: DENICE BEAULIEU 

 

           

           

REASON FOR APPOINTMENT

           

          1. POST PROC

           

HISTORY OF PRESENT ILLNESS

           

      HISTORY OF PRESENT ILLNESS:

      PAIN

           

           

          THE PATIENT DESCRIBES THE PAIN...

           

      FALL RISK SCREENING:

      SCREENING

           

           

          :NO FALLS IN THE PAST YEAR

           

      TODAY'S VISIT:

      NOTES:

          S/P LESI 17. HAS RETURNED TO WORK IS WORKING 16 HOURS A DAY

          AND IS SCHEDULED FOR SURGERY 10/9/17. RATES PAIN TODAY AS 1-2/10

          . TO SEE DR ABAD TOMORROW. .

           

CURRENT MEDICATIONS

           

          TAKING LAMOTRIGINE 100 MG TABLET 2 TABLETS ORALLY TWICE A DAY

          TAKING CLONAZEPAM 1 MG TABLET 1 TABLET ORALLY 4-6 TIMES DAILY

          TAKING SERTRALINE HCL 50 MG TABLET 1 TABLET ORALLY ONCE A DAY

          TAKING CELEBREX 200 MG CAPSULE 1 CAPSULE WITH FOOD ORALLY TWICE A

          DAY

          TAKING TIZANIDINE HCL 4 MG TABLET 1 TABLET AS NEEDED ORALLY THREE

          TIMES A DAY

          TAKING ACETAMINOPHEN EXTRA STRENGTH 500 MG TABLET 2 TABLETS AS

          NEEDED ORALLY EVERY 6 HRS

          TAKING OXYCODONE-ACETAMINOPHEN 5-325 MG TABLET 1 TABLET AS NEEDED

          ORALLY EVERY 8 HRS AS NEEDED MDD 4

          MEDICATION LIST REVIEWED AND RECONCILED WITH THE PATIENT

           

PAST MEDICAL HISTORY

           

          HYPERCHOLESTEROLEMIA

          ANXIETY

          BACK PAIN

          CARDIAC ACTH

           

ALLERGIES

           

          PENICILLIN (FOR ALLERGIES USE ONLY)

          BEE STINGS

          SHELLFISH: RASH, RESPIRATORY DIFFICULTIES: ALLERGY

           

REVIEW OF SYSTEMS

           

      REVIEWED BY:

           

          PROVIDER:    DENICE BEAULIEU FNP .

           

      CONSTITUTIONAL:

           

          ANY CHANGE IN YOUR MEDICAL CONDITION?    NO . CHILLS    NO .

          FEVER    NO .

           

      INFECTION:

           

          DO YOU HAVE NEW INFECTIONS?    NO . DO YOU HAVE HISTORY OF MRSA? 

            NO .

           

      MUSCULOSKELETAL:

           

          ANY NEW PATTERNS OF PAIN OR NUMBNESS?    NO .

           

      GASTROENTEROLOGY:

           

          ANY NEW CHANGE IN BOWEL CONTROL?    NO .

           

      GENITOURINARY:

           

          ANY NEW CHANGE IN BLADDER CONTROL?    NO . IS THERE A CHANCE YOU

          COULD BE PREGNANT?    NO .

           

      HEMATOLOGY/LYMPH:

           

          DO YOU TAKE ANY BLOOD THINNERS? (FOR EXAMPLE- COUMADIN, PLAVIX,

          AGGRENOX, PLATEL, PRADAXA, OR XARELTO)    NO . WHEN WAS YOUR LAST

          DOSE?    DATE: TIME:  .

           

      NEUROLOGY:

           

          HAVE YOU FALLEN IN THE PAST 6 MONTHS?    NO . ANY NEW EXTREMITY

          NUMBNESS OR WEAKNESS?    NO .

           

      CARDIOLOGY:

           

          DO YOU HAVE A PACEMAKER OR DEFIBRILLATOR?    NO .

           

      RESPIRATORY:

           

          HAVE YOU BEEN SICK IN THE PAST WEEK?    NO . FEVER    NO . FLU

          LIKE SYMPTOMS?    NO . COUGH    NO .

           

      INTEGUMENTARY:

           

          DO YOU HAVE ANY RASHES OR OPEN SORES?    NO .

           

      ALLERGIC/IMMUNO:

           

          ARE YOU ALLERGIC TO SHELLFISH OR IV DYE?    YES . ANY NEW

          ALLERGIES?    NO .

           

      PSYCHIATRIC:

           

          DO YOU HAVE THOUGHTS OF HURTING YOURSELF OR SOMEONE ELSE?    NO .

          ARE YOU ABUSED, NEGLECTED, OR IN AN UNSAFE ENVIRONMENT?    NO .

           

      ENDOCRINOLOGY:

           

          ARE YOU DIABETIC?    NO .

           

      OTHER:

           

          DO YOU NEED ANY PRESCRIPTIONS?    NO . IF YES, PLEASE LIST:   

          ____ . ANY NEW PROBLEMS WITH YOUR MEDICATIONS?    NO . WHEN DID

          YOU LAST EAT?    ____ . WHEN DID YOU LAST DRINK?    ____ . WHAT

          DID YOU LAST DRINK?    ____ . NAME OF PERSON DRIVING YOU HOME?   

          ____ . DO YOU HAVE ANY OTHER QUESTIONS OR CONCERNS    NO .

           

VITAL SIGNS

           

           LBS, HT 69 IN, BMI 27.91 INDEX, /80 MM HG, HR 85

          /MIN, RR 18 /MIN, TEMP 98.0 F, OXYGEN SAT % 94%, NA INITIALS AW

          1428.

           

EXAMINATION

           

      GENERAL EXAMINATION:

          PSYCHALERT , ORIENTED X 3 , APPROPRIATE MOOD AND AFFECT .

           

          LUNGS:CLEAR TO AUSCULTATION BILATERALLY.

           

          HEART:HEART RATE REGULAR.

           

          MUSCULOSKELETAL:MILD TENDERNESS OVER RIGHT LUMBO SACRAL AXIS. ,

          MUSCLE STRENGTH TESTING 5/5 BILATERAL LOWER EXTREMITIES.

           

ASSESSMENTS

           

          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

           

          LUMBAR RADICULOPATHY - M54.16

           

          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

           

TREATMENT

           

      LUMBAR DISC DISPLACEMENT WITHOUT MYELOPATHY

          NOTES: CONTINUE ALT HEAT/ICE. WALK EVERY DAY. LISTEN TO YOU BODY.

          FOLLOW UP WITH DR ABAD.

           

PROCEDURE CODES

           

           ESTABILISHED PATIENT [facility] CHARGE

           

DISPOSITION & COMMUNICATION

           

FOLLOW UP

           

          MID NOV (REASON: BACK PAIN)

           

 

ELECTRONICALLY SIGNED BY RUPESH PARRISH ON

          10/17/2017 AT 02:31 PM EDT

           

           

           

 

DISCLAIMER :

THIS IS A VISIT SUMMARY EXTRACTED FROM THE [employer] CHART.

IT IS NOT A COPY OF THE [employer] PROGRESS NOTE.

BALDO

## 2019-12-04 ENCOUNTER — HOSPITAL ENCOUNTER (OUTPATIENT)
Dept: HOSPITAL 53 - M LAB | Age: 54
End: 2019-12-04
Attending: PHYSICIAN ASSISTANT
Payer: COMMERCIAL

## 2019-12-04 DIAGNOSIS — E78.2: Primary | ICD-10-CM

## 2019-12-04 LAB
ALBUMIN SERPL BCG-MCNC: 3.8 GM/DL (ref 3.2–5.2)
ALT SERPL W P-5'-P-CCNC: 37 U/L (ref 12–78)
BASOPHILS # BLD AUTO: 0 10^3/UL (ref 0–0.2)
BASOPHILS NFR BLD AUTO: 0.5 % (ref 0–1)
BILIRUB SERPL-MCNC: 0.8 MG/DL (ref 0.2–1)
BUN SERPL-MCNC: 14 MG/DL (ref 7–18)
CALCIUM SERPL-MCNC: 9.1 MG/DL (ref 8.5–10.1)
CHLORIDE SERPL-SCNC: 109 MEQ/L (ref 98–107)
CHOLEST SERPL-MCNC: 195 MG/DL (ref ?–200)
CHOLEST/HDLC SERPL: 3.98 {RATIO} (ref ?–5)
CO2 SERPL-SCNC: 33 MEQ/L (ref 21–32)
CREAT SERPL-MCNC: 1.03 MG/DL (ref 0.7–1.3)
EOSINOPHIL # BLD AUTO: 0.1 10^3/UL (ref 0–0.5)
EOSINOPHIL NFR BLD AUTO: 1.8 % (ref 0–3)
EST. AVERAGE GLUCOSE BLD GHB EST-MCNC: 114 MG/DL (ref 60–110)
GFR SERPL CREATININE-BSD FRML MDRD: > 60 ML/MIN/{1.73_M2} (ref 56–?)
GLUCOSE SERPL-MCNC: 94 MG/DL (ref 70–100)
HCT VFR BLD AUTO: 50.3 % (ref 42–52)
HDLC SERPL-MCNC: 49 MG/DL (ref 40–?)
HGB BLD-MCNC: 15.9 G/DL (ref 13.5–17.5)
LDLC SERPL CALC-MCNC: 123 MG/DL (ref ?–100)
LYMPHOCYTES # BLD AUTO: 1.5 10^3/UL (ref 1.5–5)
LYMPHOCYTES NFR BLD AUTO: 26.4 % (ref 24–44)
MCH RBC QN AUTO: 30.1 PG (ref 27–33)
MCHC RBC AUTO-ENTMCNC: 31.6 G/DL (ref 32–36.5)
MCV RBC AUTO: 95.3 FL (ref 80–96)
MONOCYTES # BLD AUTO: 0.5 10^3/UL (ref 0–0.8)
MONOCYTES NFR BLD AUTO: 8.5 % (ref 0–5)
NEUTROPHILS # BLD AUTO: 3.4 10^3/UL (ref 1.5–8.5)
NEUTROPHILS NFR BLD AUTO: 62.4 % (ref 36–66)
NONHDLC SERPL-MCNC: 146 MG/DL
PLATELET # BLD AUTO: 204 10^3/UL (ref 150–450)
POTASSIUM SERPL-SCNC: 4.7 MEQ/L (ref 3.5–5.1)
PROT SERPL-MCNC: 7.2 GM/DL (ref 6.4–8.2)
RBC # BLD AUTO: 5.28 10^6/UL (ref 4.3–6.1)
SODIUM SERPL-SCNC: 145 MEQ/L (ref 136–145)
T4 FREE SERPL-MCNC: 0.88 NG/DL (ref 0.76–1.46)
TRIGL SERPL-MCNC: 113 MG/DL (ref ?–150)
TSH SERPL DL<=0.005 MIU/L-ACNC: 1.44 UIU/ML (ref 0.36–3.74)
WBC # BLD AUTO: 5.5 10^3/UL (ref 4–10)

## 2019-12-06 LAB
TESTOST FREE SERPL-MCNC: 16.1 PG/ML (ref 7.2–24)
TESTOST SERPL-MCNC: 244 NG/DL (ref 264–916)